# Patient Record
Sex: MALE | Race: WHITE | Employment: UNEMPLOYED | URBAN - METROPOLITAN AREA
[De-identification: names, ages, dates, MRNs, and addresses within clinical notes are randomized per-mention and may not be internally consistent; named-entity substitution may affect disease eponyms.]

---

## 2018-07-25 ENCOUNTER — HOSPITAL ENCOUNTER (EMERGENCY)
Age: 18
Discharge: HOME | End: 2018-07-25
Attending: EMERGENCY MEDICINE
Payer: COMMERCIAL

## 2018-07-25 VITALS
HEART RATE: 67 BPM | DIASTOLIC BLOOD PRESSURE: 57 MMHG | TEMPERATURE: 98 F | WEIGHT: 170 LBS | HEIGHT: 71 IN | RESPIRATION RATE: 18 BRPM | OXYGEN SATURATION: 99 % | BODY MASS INDEX: 23.8 KG/M2 | SYSTOLIC BLOOD PRESSURE: 110 MMHG

## 2018-07-25 DIAGNOSIS — S70.352A FOREIGN BODY OF LEFT THIGH, INITIAL ENCOUNTER: Primary | ICD-10-CM

## 2018-07-25 PROCEDURE — 99283 EMERGENCY DEPT VISIT LOW MDM: CPT

## 2018-07-25 RX ORDER — CEPHALEXIN 500 MG/1
500 CAPSULE ORAL 2 TIMES DAILY
Qty: 20 CAPSULE | Refills: 0 | Status: SHIPPED | OUTPATIENT
Start: 2018-07-25 | End: 2018-08-04

## 2018-07-25 ASSESSMENT — PAIN SCALES - GENERAL: PAINLEVEL_OUTOF10: 4

## 2024-03-06 DIAGNOSIS — M25.511 RIGHT SHOULDER PAIN, UNSPECIFIED CHRONICITY: Primary | ICD-10-CM

## 2024-03-12 ENCOUNTER — OFFICE VISIT (OUTPATIENT)
Dept: ORTHOPEDIC SURGERY | Age: 24
End: 2024-03-12
Payer: COMMERCIAL

## 2024-03-12 VITALS — WEIGHT: 170 LBS | TEMPERATURE: 98 F | BODY MASS INDEX: 23.8 KG/M2 | HEIGHT: 71 IN

## 2024-03-12 DIAGNOSIS — S42.91XA TRAUMATIC CLOSED DISPLACED FRACTURE OF RIGHT SHOULDER WITH ANTERIOR DISLOCATION, INITIAL ENCOUNTER: ICD-10-CM

## 2024-03-12 DIAGNOSIS — S43.431A TEAR OF RIGHT GLENOID LABRUM, INITIAL ENCOUNTER: Primary | ICD-10-CM

## 2024-03-12 PROCEDURE — 99203 OFFICE O/P NEW LOW 30 MIN: CPT | Performed by: ORTHOPAEDIC SURGERY

## 2024-03-12 NOTE — PROGRESS NOTES
,positive  Speeds,positive  Apprehension ,positive Armendariz Load Shift, negative Grisel manuver, negative Cross arm test.     Left shoulder:  negative Impingement , negative Upton ,negative  Speeds,negative  Apprehension ,negative Armendariz Load Shift, negative Grisel manuver, negative Cross arm test.     XRAY:    Normal findings    MRI:    Not performed    Radiographic findings reviewed with patient    Impression:   Encounter Diagnoses   Name Primary?    Tear of right glenoid labrum, initial encounter Yes    Traumatic closed displaced fracture of right shoulder with anterior dislocation, initial encounter        Plan: Natural history and expected course discussed. Questions answered.  Educational material distributed.  Reduction in offending activity.    Based on my exam, I am concerned he has a labrum tear.  I will order an MRI and see him back with the results.    At least 30 minutes was spent discussing the diagnosis and treatment options with the patient with at least 50% of the time was spent with decision making and counseling the patient.

## 2024-04-11 ENCOUNTER — OFFICE VISIT (OUTPATIENT)
Dept: ORTHOPEDIC SURGERY | Age: 24
End: 2024-04-11
Payer: COMMERCIAL

## 2024-04-11 VITALS — BODY MASS INDEX: 23.8 KG/M2 | TEMPERATURE: 98 F | WEIGHT: 170 LBS | HEIGHT: 71 IN

## 2024-04-11 DIAGNOSIS — S43.431A TEAR OF RIGHT GLENOID LABRUM, INITIAL ENCOUNTER: Primary | ICD-10-CM

## 2024-04-11 PROCEDURE — 99213 OFFICE O/P EST LOW 20 MIN: CPT | Performed by: ORTHOPAEDIC SURGERY

## 2024-04-11 RX ORDER — CELECOXIB 200 MG/1
200 CAPSULE ORAL DAILY
Qty: 30 CAPSULE | Refills: 3 | Status: SHIPPED | OUTPATIENT
Start: 2024-04-11 | End: 2024-08-09

## 2024-04-11 NOTE — PROGRESS NOTES
Chief Complaint   Patient presents with    Shoulder Pain     Right shoulder MRI results.        Yousif Corado is a 23 y.o. year old   male who is seen today  for evaluation of right shoulder pain.  He recently underwent an MRI and is here to discuss the results.      Chief Complaint   Patient presents with    Shoulder Pain     Right shoulder MRI results.     No past medical history on file.  No past surgical history on file.  No current outpatient medications on file.  Allergies   Allergen Reactions    Tamiflu Other (See Comments)     Patient had only seizure after receiving tamiflu.     Social History     Socioeconomic History    Marital status: Single     Spouse name: Not on file    Number of children: Not on file    Years of education: Not on file    Highest education level: Not on file   Occupational History    Not on file   Tobacco Use    Smoking status: Never    Smokeless tobacco: Never   Substance and Sexual Activity    Alcohol use: No    Drug use: No    Sexual activity: Never   Other Topics Concern    Not on file   Social History Narrative    Not on file     Social Determinants of Health     Financial Resource Strain: Not on file   Food Insecurity: Not on file   Transportation Needs: Not on file   Physical Activity: Not on file   Stress: Not on file   Social Connections: Not on file   Intimate Partner Violence: Not on file   Housing Stability: Not on file     No family history on file.    REVIEW OF SYSTEMS:     General/Constitution:  (-)weight loss, (-)fever, (-)chills, (-)weakness.   Skin: (-) rash,(-) psoriasis,(-) eczema, (-)skin cancer.   Musculoskeletal: (-) fractures,  (-) dislocations,(-) collagen vascular disease, (-) fibromyalgia, (-) multiple sclerosis, (-) muscular dystrophy, (-) RSD,(-) joint pain (-)swelling, (-) joint pain,swelling.  Neurologic: (-) epilepsy, (-)seizures,(-) brain tumor,(-) TIA, (-)stroke, (-)headaches, (-)Parkinson disease,(-) memory loss, (-) LOC.  Cardiovascular: (-)

## 2024-06-17 ENCOUNTER — OFFICE VISIT (OUTPATIENT)
Dept: ORTHOPEDIC SURGERY | Age: 24
End: 2024-06-17
Payer: COMMERCIAL

## 2024-06-17 VITALS — WEIGHT: 170 LBS | TEMPERATURE: 98 F | BODY MASS INDEX: 23.8 KG/M2 | HEIGHT: 71 IN

## 2024-06-17 DIAGNOSIS — S43.431A TEAR OF RIGHT GLENOID LABRUM, INITIAL ENCOUNTER: Primary | ICD-10-CM

## 2024-06-17 PROCEDURE — 99213 OFFICE O/P EST LOW 20 MIN: CPT | Performed by: ORTHOPAEDIC SURGERY

## 2024-06-17 NOTE — PROGRESS NOTES
seconds in all digits. There is not edema of the bilateral upper extremities.  There is not varicosities noted in the distal extremities.      Lymph:  Upon palpation,  there is no lymphadenopathy noted in bilateral upper extremities.      Musculoskeletal:  Gait: normal; examination of the nails and digits reveal no cyanosis or clubbing.      Cervical Exam:  On physical exam, Yousif Corado is well-developed, well-nourished, oriented to person, place and time.  his gait is normal.  On evaluation of hiscervical spine, He has full range of motion of the cervical spine without pain.  There is no cervical tenderness to palpation.     Shoulder Exam:  On evaluation of his bilaterally upper extremities, his right shoulder has no deformity.  There is tenderness upon palpation of the anterior and lateral shoulder.  There is not evidence of scapular dyskinesis.  There is not muscle atrophy in shoulder girdle. The range of motion for the Right Shoulder is 160/45/T12 and for the Left shoulder is 180/50/T6.  Right shoulder Motor strength is 5-/5 in the supraspinatus, 5-/5 internal rotation and 5-/5 in external rotation, and Left shoulder motor strength 5/5 in supraspinatus, 5/5 in internal rotation, 5/5 in external rotation.        Right shoulder:  positive Impingement , positive Upton ,positive  Speeds,positive  Apprehension ,positive Armendariz Load Shift, negative Grisel manuver, negative Cross arm test.     Left shoulder:  negative Impingement , negative Upton ,negative  Speeds,negative  Apprehension ,negative Armendariz Load Shift, negative Grisel manuver, negative Cross arm test.     XRAY:    Normal findings    MRI:    1. Subtle nondisplaced tear of the posterosuperior labrum.  2. No evidence of rotator cuff tear.    Radiographic findings reviewed with patient    Impression:   Encounter Diagnosis   Name Primary?    Tear of right glenoid labrum, initial encounter Yes           Plan: Natural history and expected course discussed.